# Patient Record
Sex: MALE | Race: ASIAN | NOT HISPANIC OR LATINO | Employment: FULL TIME | URBAN - METROPOLITAN AREA
[De-identification: names, ages, dates, MRNs, and addresses within clinical notes are randomized per-mention and may not be internally consistent; named-entity substitution may affect disease eponyms.]

---

## 2020-05-22 ENCOUNTER — VIRTUAL VISIT (OUTPATIENT)
Dept: URGENT CARE | Facility: CLINIC | Age: 20
End: 2020-05-22
Payer: COMMERCIAL

## 2020-05-22 DIAGNOSIS — U07.1 CLINICAL DIAGNOSIS OF COVID-19: ICD-10-CM

## 2020-05-22 DIAGNOSIS — G44.209 TENSION HEADACHE: Primary | ICD-10-CM

## 2020-05-22 PROCEDURE — 99203 OFFICE O/P NEW LOW 30 MIN: CPT | Mod: 95 | Performed by: FAMILY MEDICINE

## 2020-05-22 RX ORDER — IBUPROFEN 200 MG
600 TABLET ORAL EVERY 8 HOURS PRN
Qty: 90 TABLET | Refills: 1 | Status: SHIPPED | OUTPATIENT
Start: 2020-05-22

## 2020-05-22 ASSESSMENT — ENCOUNTER SYMPTOMS
DIARRHEA: 1
FATIGUE: 1
NAUSEA: 1
COUGH: 1
VOMITING: 0
HEADACHES: 1
ACTIVITY CHANGE: 1
APPETITE CHANGE: 1
CHILLS: 1
EYE PAIN: 1
EYE REDNESS: 0
SHORTNESS OF BREATH: 1
CHEST TIGHTNESS: 1

## 2020-05-22 NOTE — PROGRESS NOTES
"The patient has been notified of following:     \"This telephone visit will be conducted via a call between you and your physician/provider. We have found that certain health care needs can be provided without the need for a physical exam.  This service lets us provide the care you need with a short phone conversation.  If a prescription is necessary we can send it directly to your pharmacy.      Telephone visits are billed at different rates depending on your insurance coverage. During this emergency period, for some insurers they may be billed the same as an in-person visit.  Please reach out to your insurance provider with any questions.    If during the course of the call the physician/provider feels a telephone visit is not appropriate, you will not be charged for this service.\"    Patient has given verbal consent for Telephone visit?  Yes      Subjective     CC: Genaro Silverio  is a 20 year old male who presents to clinic today for the following health issues:   Chief Complaint   Patient presents with     Suspected Covid        History:  History was gathered from Genaro and a family member through a [employer] .    Genaro started to feel sick 2-3 days ago.  He reports his breathing is different, it is faster than normal.  He is able to walk across the room but walking up stairs would make him very tired.  He has a productive cough both during the day and waking him up at night.  He describes what is coming up just as phlem.   He also feels chills and a headache.  He hasn't been able to eat, he feels nauseated and wants to throw up but hasn't, and also has diarrhea.  He is able to drink and keep fluids down.  He endorses anosmia.    Genaro also reports that \"my eyes hurt.\"  It feels like his eyes, not behind his eyes.  It hurts to move his eyes.  His vision is not different.  This eye pain has been happening periodically since age 7, it always comes with a headache. Before Tylenol would help it go away, but now is not " "relieved with Tylenol.    Soc Hx:  Genaro lives at home with 10 people, including his mother, aunt, uncle, brother and sister.  They are all sick as well, his mother got a COVID test but does not have results yet.  He also knows sick people at work, he last worked on 5/14/20.  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 I am coughing up a lot of mucus.     What is the appearance of the mucus? Unable to clarify    If having shortness of breath, how severe?: Mild dyspnea (decreased exercise tolerance, able to do ADLs without difficulty)    If having fever, what is Tmax in the past 24 hours?: unable to measure    Denies: Conjunctivitis    When did the symptoms starts?: 5/19/20    Do you smoke? No     Have you ever smoked? I have never smoked       Are there people you know with similar symptoms? Yes     What was the patient(s) diagnosed with? Results pending    In the 14 days before your symptoms started, have you had close contact with a COVID-19 (Coronavirus) patient? Doesn't know    In the 14 days before your symptoms started, have you traveled internationally or to a state with high rates of COVID19? No    Have you recently been hospitalized? No    Are you pregnant or breastfeeding?: No    Are you or a household member a healthcare worker who provides direct patient care or a ? Unable to confirm    Do you live in a residential facility like a nursing home or group home? No    Do you have a way to get food/medications if quarantined? No    No past medical history on file.  Current Outpatient Medications   Medication     ibuprofen (ADVIL/MOTRIN) 200 MG tablet "     No current facility-administered medications for this visit.      No Known Allergies    Reviewed and updated as needed this visit by Provider    Review of Systems   Constitutional: Positive for activity change, appetite change, chills and fatigue.   Eyes: Positive for pain. Negative for redness.   Respiratory: Positive for cough, chest tightness and shortness of breath.    Gastrointestinal: Positive for diarrhea and nausea. Negative for vomiting.   Skin: Negative for rash.   Neurological: Positive for headaches.          Objective    Gen: Patient is alert, oriented  Resp: Speaking full sentence, no audible shortness of breath.  No cough of wheeze.  Psych: Unable to assess through        Assessment/Plan:  1. Tension headache  Headaches with eye pain and light sensitivity that have been periodic since a young age and are relieved with Tylenol could be tension headache, possibly migraine.  Plan: Advised patient to try ibuprofin, send Rx to pharmacy.  If that does not relieve eye pain, pt instructed to go to ER to have eye pain evaluated.    - ibuprofen (ADVIL/MOTRIN) 200 MG tablet; Take 3 tablets (600 mg) by mouth every 8 hours as needed for mild pain  Dispense: 90 tablet; Refill: 1    2. Clinical diagnosis of COVID-19  Pt has 3 days of cough, SOB, subjective fever, anosmia, nausea, and diarrhea.  Likely COVID-19 infection, although other viral URI and viral gastroenteritis cannot be ruled out.  Risk of COVID infection high with others in the house also ill with possible COVID and possible exposures at work (although no confirmed cases.)  Plan: encouraged drinking fluids.  Reinforced need for everyone in the home to stay home for 10 days after sx onset.    [facility] does not meet [facility] criteria for COVID-19 PCR testing.    Phone call duration:  45 minutes    Isabel Orr, MSKirstie  05/22/204:30 PM

## 2020-05-23 NOTE — PROGRESS NOTES
I was present during this telephone visit with the medical student who participated in the service and in the documentation of the note. I have verified the history and personally performed the physical exam and medical decision making. I agree with the assessment and plan of care as documented in the note with the following additions:  Eye pain is obviously concerning but patient reports it is the same pain he has had on and off for many years. Advised trial of NSAIDs and if not improving to seek in person evaluation.     I personally spent a total of 26 minutes speaking with Genaro Silverio via  during today s visit.     Mark Huang MD  10:17 AM, May 23, 2020

## 2020-05-29 ENCOUNTER — VIRTUAL VISIT (OUTPATIENT)
Dept: FAMILY MEDICINE | Facility: CLINIC | Age: 20
End: 2020-05-29
Payer: COMMERCIAL

## 2020-05-29 DIAGNOSIS — Z20.822 SUSPECTED COVID-19 VIRUS INFECTION: Primary | ICD-10-CM

## 2020-05-29 NOTE — PROGRESS NOTES
" Family Medicine Telephone Visit Note         Telephone Visit Consent   Patient was verbally read the following and verbal consent was obtained.    \"Telephone visits are billed at different rates depending on your insurance coverage. During this emergency period, for some insurers they may be billed the same as an in-person visit.  Please reach out to your insurance provider with any questions.  If during the course of the call the physician/provider feels a telephone visit is not appropriate, you will not be charged for this service.\"    Name person giving consent:  Patient   Date verbal consent given:  5/29/2020  Time verbal consent given:  9:42 AM    Chief Complaint   Patient presents with     Headache     Generalized Body Aches     Due to patient being non-English speaking/uses sign language, an  was used for this visit. Only for face-to-face interpretation by an external agency, date and length of interpretation can be found on the scanned worksheet.     name: Roseann boggs  Agency: [employer]  Language: Rayna  Telephone number: [phone]  Type of interpretation: Telephone, spoken         HPI   Patients name: [facility]  Appointment start time:  9:45 AM    Patient is calling about ongoing URI symptoms that have been ongoing for the past 2 weeks.  Reports that both his mom and his aunt had tested positive for COVID-19 and that he had exposure to them.  They all currently live in the same house.  Initially started as having shortness of breath and a dry cough but states that that this has been improving.  He has had fevers and chills but reports that his last fever was about 3 days ago.  Patient would like an work note as this is needed to go back to work.  He also needs a confirmation of negative testing before he goes back to work.  Patient currently works at [employer] and [employer] company and would like his results inability to work faxed to them.  He is unsure of the fax number and reports that he will call " "us back with this information.  Otherwise denies any abdominal pain, no breathing concerns at this time.  He has been trying to quarantine himself from outsiders.    Current Outpatient Medications   Medication Sig Dispense Refill     ibuprofen (ADVIL/MOTRIN) 200 MG tablet Take 3 tablets (600 mg) by mouth every 8 hours as needed for mild pain 90 tablet 1     No Known Allergies         Review of Systems:     Constitutional, HEENT, cardiovascular, pulmonary, gi and gu systems are negative, except as otherwise noted.         Physical Exam:     There were no vitals taken for this visit.  Estimated body mass index is 22.27 kg/m  as calculated from the following:    Height as of 10/22/15: 1.641 m (5' 4.6\").    Weight as of 10/22/15: 60 kg (132 lb 3.2 oz).    Exam:  Constitutional: healthy, alert and no distress  Psychiatric: mentation appears normal and affect normal/bright     Results from the last 24 hoursNo results found for this or any previous visit (from the past 24 hour(s)).        Assessment and Plan   Genaro was seen today for headache and generalized body aches.    Diagnoses and all orders for this visit:    Suspected Covid-19 Virus Infection  Patient is a 20-year-old previously healthy male who is calling in today about a 2-week history of headache, generalized body aches, fever, shortness of breath as well as dry cough.  Reports that his symptoms have gotten much better over the past 3 days.  He states that it has been 14 days since symptom onset, has been fever free during the 48 hours and his cough is not completely resolved.  His work does not want him to have cleared testing before he returns back to work.  Patient does drive and so I am okay with him getting tested for COVID-19.  Future testing is placed for this.  -     Symptomatic COVID-19 Virus (Coronavirus) by PCR    Refilled medications that would be required in the next 3 months.     After Visit Information:  Patient declined AVS     No follow-ups on " file.    Appointment end time: 10:01 AM  This is a telephone visit that took 16 minutes.    Clinician location:  [facility]    Justus Ritchie MD  I precepted today with Dr. Forest Moon.

## 2020-06-04 NOTE — PROGRESS NOTES
Preceptor Attestation:   I talked to the patient on the phone. I discussed the patient with the resident. I have verified the content of the note, which accurately reflects my assessment of the patient and the plan of care.   Supervising Physician:  Forest Moon MD.

## 2020-06-06 ENCOUNTER — TELEPHONE (OUTPATIENT)
Dept: FAMILY MEDICINE | Facility: CLINIC | Age: 20
End: 2020-06-06

## 2020-06-06 NOTE — TELEPHONE ENCOUNTER
Patient called as part of COVID outreach for the [facility]:      Patient was unable to be reached      Nathaniel Costello, MS4    Dr. Pau Mendoza is the supervising attending physician.

## 2020-06-08 NOTE — TELEPHONE ENCOUNTER
Patient called as part of COVID outreach for the [facility].    Unable to reach patient, could not leave message.     Dinora Jennings, MS4  [employer]

## 2020-06-09 NOTE — TELEPHONE ENCOUNTER
Follow-up call made to [facility]/Rayna/[facility] patients with respiratory symptoms or COVID-19 PUI    Unable to reach patient. Left message after 2 consecutive phone calls. Called a third third time 15 minutes later and was still unable to reach patient.    Nathaniel Costello, MS4    Dr. Pau Mendoza is the supervising attending physician

## 2020-06-10 NOTE — TELEPHONE ENCOUNTER
Follow-up call made to [facility]/Rayna/[facility] patients with respiratory symptoms or COVID-19 PUI    Patient name: Genaro Silverio  Date of follow-up: 06/10/20   Time of follow-up:  3:15 PM; 3:28PM   Language:  Rayna  PCP:  Rahul Fischer  Outreach Caller Name: Barbara Cota  Day of Symptoms #20 (sx onset 5/20/20)    No answer left message that we will follow-up tomorrow.   Barbara Cota, MS4

## 2020-06-11 ENCOUNTER — TELEPHONE (OUTPATIENT)
Dept: FAMILY MEDICINE | Facility: CLINIC | Age: 20
End: 2020-06-11

## 2020-06-11 NOTE — TELEPHONE ENCOUNTER
Follow-up call made to [facility]/Rayna/[facility] patients with respiratory symptoms or COVID-19 PUI    UNABLE TO CONTACT    Patient name: Genaro Silverio  Date of follow-up: 06/11/20   Time of follow-up:  12:05 PM  Language:  Rayna  PCP:  Rahul Fischer  Outreach Caller Name: Brenda De Santiago, MS4  Day of Symptoms #5    Symptoms Question to patients Patient response   Breathing Over the last 24 hours, have your breathing issues improved, stayed the same, or worsened?    Fever Over the last 24 hours, has your fever improved, stayed the same, or worsened?    Intake Over the last 24 hours, have you been able to eat and drink?      Cognition Over the last 24 hours, have you or your family noticed any confusion or changes in your function?      Other notes:       Would you like to continue to receive check in calls from us?      Is anyone else in your family showing symptoms of COVID?   Would it be good for us to check in with them as well?     Plan:     Brenda De Santiago    I discussed this patient with faculty physician:

## 2020-06-12 NOTE — TELEPHONE ENCOUNTER
Follow-up call made to [facility]/Rayna/[facility] patients with respiratory symptoms or COVID-19 PUI    Patient name: Genaro Silverio  Date of follow-up: 06/12/20   Time of follow-up:  9:43 AM  Language:  Rayna  PCP:  Rahul Fischer  Outreach Caller Name: Priscilla Jennings, MS4  Day of Symptoms #6    Unable to reach patient, left message. Because it has been multiple days and we have not been able to reach the patient, checked phone numbers, only has one. In message explained that we will stop calling and he can call the clinic if he is not feeling well or has questions.    Dinora Jennings, MS4  [employer]

## 2021-12-31 ENCOUNTER — OFFICE VISIT (OUTPATIENT)
Dept: FAMILY MEDICINE | Facility: CLINIC | Age: 21
End: 2021-12-31
Payer: COMMERCIAL

## 2021-12-31 VITALS
HEART RATE: 93 BPM | OXYGEN SATURATION: 96 % | DIASTOLIC BLOOD PRESSURE: 87 MMHG | SYSTOLIC BLOOD PRESSURE: 133 MMHG | TEMPERATURE: 98.2 F | RESPIRATION RATE: 20 BRPM | WEIGHT: 150 LBS

## 2021-12-31 DIAGNOSIS — U07.1 COVID-19 VIRUS INFECTION: Primary | ICD-10-CM

## 2021-12-31 LAB
FLUAV AG SPEC QL IA: NEGATIVE
FLUBV AG SPEC QL IA: NEGATIVE

## 2021-12-31 PROCEDURE — U0003 INFECTIOUS AGENT DETECTION BY NUCLEIC ACID (DNA OR RNA); SEVERE ACUTE RESPIRATORY SYNDROME CORONAVIRUS 2 (SARS-COV-2) (CORONAVIRUS DISEASE [COVID-19]), AMPLIFIED PROBE TECHNIQUE, MAKING USE OF HIGH THROUGHPUT TECHNOLOGIES AS DESCRIBED BY CMS-2020-01-R: HCPCS | Performed by: STUDENT IN AN ORGANIZED HEALTH CARE EDUCATION/TRAINING PROGRAM

## 2021-12-31 PROCEDURE — 99213 OFFICE O/P EST LOW 20 MIN: CPT | Mod: CS | Performed by: STUDENT IN AN ORGANIZED HEALTH CARE EDUCATION/TRAINING PROGRAM

## 2021-12-31 PROCEDURE — 87804 INFLUENZA ASSAY W/OPTIC: CPT | Performed by: STUDENT IN AN ORGANIZED HEALTH CARE EDUCATION/TRAINING PROGRAM

## 2021-12-31 PROCEDURE — U0005 INFEC AGEN DETEC AMPLI PROBE: HCPCS | Performed by: STUDENT IN AN ORGANIZED HEALTH CARE EDUCATION/TRAINING PROGRAM

## 2021-12-31 NOTE — PROGRESS NOTES
Preceptor Attestation:  I discussed the patient with the resident and evaluated the patient in person. I have verified the content of the note, which accurately reflects my assessment of the patient and the plan of care.  Supervising Physician:  Tianna Mendoza MD.

## 2021-12-31 NOTE — PATIENT INSTRUCTIONS
1. Today we discussed cold symptoms.  2. Please take acetaminophen and ibuprofen for symptomatic relief as needed.  3. I will call you with your lab results once completed, which may take 1-2 days.  4. Please seek medical assistance if you develop notably worsening symptoms, fever, unintended weight loss, dehydration, shortness of breath, bloody cough, difficulty eating/drinking, drooling, or nausea with vomiting.    Please call [facility] with any questions or concerns.    Best regard,    Elton Juárez MD

## 2021-12-31 NOTE — LETTER
December 31, 2021      Genaro Silverio  [address] [place] [address]        To whom it may concern:    Genaro Silverio was seen at [facility] on 12/31/2021. He is not feeling well. Please excuse his absence while awaiting testing results.    Sincerely,    Elton Juárez MD

## 2021-12-31 NOTE — PROGRESS NOTES
Assessment and Plan        Genaro was seen today for fever.    Diagnoses and all orders for this visit:    COVID-19 virus infection  Patient presented with acute URI symptoms as described below. Has received COVID-19 vaccines, but due for booster. Patient was afebrile, oxygen level normal, and exam notable for no respiratory distress, lungs clear. COVID-19 test positive. Not at increased risk for severe disease. Discussed symptomatic relief options, indications for urgent re-evaluation, and indications for ending isolation.  -     Symptomatic; Yes; 12/29/2021 COVID-19 Virus (Coronavirus) by PCR Nose  -     Influenza A & B Antigen - Clinic Collect    Options for treatment and follow-up care were reviewed with the patient. Patient engaged in the decision making process and verbalized understanding of the options discussed and agreed with the final plan.    Patient was staffed with attending physician Dr. Mendoza.    Elton Juárez MD PGY2           HPI       Genaroalvaro Silverio is a 21 year old year old male w/ PMH of There is no problem list on file for this patient.   who presents for   Chief Complaint   Patient presents with     Fever     coughing, sore throat sob bodyaches 2 days     Acute Illness   Concerns: Feeling ill  When did it start? 2 days ago  Is it getting better, worse or staying the same? unchanged    Fatigue/Achiness?: YES    Fever?:  YES tactile    Headache (location?) YES     Sinus Pressure?:No     Eye redness/Discharge?: No     Ear Pain?: No     Runny nose?:  YES     Sore Throat?:  YES  Respiratory    Cough?:  YES-productive of clear sputum    Wheeze?: No   GI/    Decreased Appetite?:  No     Nausea?: No     Vomiting?:  No     Diarrhea?:  No       Any Illness Exposure?:  YES sister    Any foreign travel or contact with anyone ill who travelled abroad? No          Review of Systems:   7 point ROS negative other than as specified above.         Physical Exam:   /87 (BP Location: Left arm, Patient  Position: Sitting, Cuff Size: Adult Regular)   Pulse 93   Temp 98.2  F (36.8  C) (Oral)   Resp 20   Wt 68 kg (150 lb)   SpO2 96%     Vitals were reviewed and were normal     Exam:  Constitutional: healthy, alert, no distress, and cooperative  Head: Normocephalic  Neck: Neck supple. No adenopathy.  ENT: oral mucosa moist; ear canal & TM normal; throat normal without erythema or exudate  Cardiovascular: RRR w/o audible murmur  Respiratory: bilateral clear lungs w/o wheezing, crackles or rhonchi; breathing comfortably on RA  Gastrointestinal: Abdomen soft, non-tender  Musculoskeletal: extremities normal- no gross deformities noted, gait normal, and normal muscle tone  Neurologic: grossly normal CN  Psychiatric: mentation appears normal and affect normal      Results:      Results from this visit  Results for orders placed or performed in visit on 12/31/21   Symptomatic; Yes; 12/29/2021 COVID-19 Virus (Coronavirus) by PCR Nose     Status: Abnormal    Specimen: Nose; Swab   Result Value Ref Range    SARS CoV2 PCR Positive (A) Negative, Testing sent to reference lab. Results will be returned via unsolicited result    Narrative    Testing was performed using the Aptima SARS-CoV-2 Assay on the  [employer] Instrument System. Additional information about this  Emergency Use Authorization (EUA) assay can be found via the Lab  Guide. This test should be ordered for the detection of SARS-CoV-2 in  individuals who meet SARS-CoV-2 clinical and/or epidemiological  criteria. Test performance is unknown in asymptomatic patients. This  test is for in vitro diagnostic use under the FDA EUA for  laboratories certified under CLIA to perform high complexity testing.  This test has not been FDA cleared or approved. A negative result  does not rule out the presence of PCR inhibitors in the specimen or  target RNA in concentration below the limit of detection for the  assay. The possibility of a false negative should be considered if  the  patient's recent exposure or clinical presentation suggests  COVID-19. This test was validated by the [facility]. This laboratory is certified under  the Clinical Laboratory Improvement Amendments of 1988 (CLIA-88) as  qualified to perform high complexity laboratory testing.   Influenza A & B Antigen - Clinic Collect     Status: Normal    Specimen: Nose; Swab   Result Value Ref Range    Influenza A antigen Negative Negative    Influenza B antigen Negative Negative    Narrative    Test results must be correlated with clinical data. If necessary, results should be confirmed by a molecular assay or viral culture.

## 2022-01-01 LAB — SARS-COV-2 RNA RESP QL NAA+PROBE: POSITIVE

## 2022-01-02 ENCOUNTER — TELEPHONE (OUTPATIENT)
Dept: FAMILY MEDICINE | Facility: CLINIC | Age: 22
End: 2022-01-02
Payer: COMMERCIAL

## 2022-01-02 NOTE — TELEPHONE ENCOUNTER
Coronavirus (COVID-19) Notification    Reason for call  Notify of POSITIVE  COVID-19 lab result, assess symptoms,  review [facility] recommendations    Lab Result   Lab test for 2019-nCoV rRt-PCR or SARS-COV-2 PCR  Oropharyngeal AND/OR nasopharyngeal swabs were POSITIVE for 2019-nCoV RNA [OR] SARS-COV-2 RNA (COVID-19) RNA     We have been unable to reach Patient by phone at this time to notify of their Positive COVID-19 result.  Left voicemail message requesting a call back to [phone] [facility] for results.        POSITIVE COVID-19 Letter sent.    Brianna Reynoso LPN

## 2022-01-06 PROBLEM — U07.1 COVID-19 VIRUS INFECTION: Status: ACTIVE | Noted: 2022-01-06

## 2023-02-22 ENCOUNTER — APPOINTMENT (OUTPATIENT)
Dept: RADIOLOGY | Facility: HOSPITAL | Age: 23
End: 2023-02-22
Attending: EMERGENCY MEDICINE
Payer: COMMERCIAL

## 2023-02-22 ENCOUNTER — HOSPITAL ENCOUNTER (EMERGENCY)
Facility: HOSPITAL | Age: 23
Discharge: HOME | End: 2023-02-22
Attending: EMERGENCY MEDICINE | Admitting: EMERGENCY MEDICINE
Payer: COMMERCIAL

## 2023-02-22 VITALS
OXYGEN SATURATION: 98 % | TEMPERATURE: 98.6 F | RESPIRATION RATE: 18 BRPM | DIASTOLIC BLOOD PRESSURE: 80 MMHG | SYSTOLIC BLOOD PRESSURE: 138 MMHG | HEART RATE: 70 BPM | WEIGHT: 160 LBS

## 2023-02-22 DIAGNOSIS — R10.84 ABDOMINAL PAIN, GENERALIZED: ICD-10-CM

## 2023-02-22 DIAGNOSIS — K59.00 CONSTIPATION, UNSPECIFIED CONSTIPATION TYPE: ICD-10-CM

## 2023-02-22 LAB
ALBUMIN SERPL BCG-MCNC: 4.4 G/DL (ref 3.5–5.2)
ALP SERPL-CCNC: 89 U/L (ref 40–129)
ALT SERPL W P-5'-P-CCNC: 19 U/L (ref 10–50)
ANION GAP SERPL CALCULATED.3IONS-SCNC: 7 MMOL/L (ref 7–15)
AST SERPL W P-5'-P-CCNC: 18 U/L (ref 10–50)
BASOPHILS # BLD AUTO: 0 10E3/UL (ref 0–0.2)
BASOPHILS NFR BLD AUTO: 0 %
BILIRUB SERPL-MCNC: 0.4 MG/DL
BUN SERPL-MCNC: 14.2 MG/DL (ref 6–20)
CALCIUM SERPL-MCNC: 9.4 MG/DL (ref 8.6–10)
CHLORIDE SERPL-SCNC: 103 MMOL/L (ref 98–107)
CREAT SERPL-MCNC: 0.89 MG/DL (ref 0.67–1.17)
DEPRECATED HCO3 PLAS-SCNC: 25 MMOL/L (ref 22–29)
EOSINOPHIL # BLD AUTO: 0 10E3/UL (ref 0–0.7)
EOSINOPHIL NFR BLD AUTO: 0 %
ERYTHROCYTE [DISTWIDTH] IN BLOOD BY AUTOMATED COUNT: 12.8 % (ref 10–15)
GFR SERPL CREATININE-BSD FRML MDRD: >90 ML/MIN/1.73M2
GLUCOSE SERPL-MCNC: 111 MG/DL (ref 70–99)
HCT VFR BLD AUTO: 48 % (ref 40–53)
HGB BLD-MCNC: 15.4 G/DL (ref 13.3–17.7)
IMM GRANULOCYTES # BLD: 0 10E3/UL
IMM GRANULOCYTES NFR BLD: 0 %
LIPASE SERPL-CCNC: 17 U/L (ref 13–60)
LYMPHOCYTES # BLD AUTO: 1.3 10E3/UL (ref 0.8–5.3)
LYMPHOCYTES NFR BLD AUTO: 13 %
MCH RBC QN AUTO: 28.1 PG (ref 26.5–33)
MCHC RBC AUTO-ENTMCNC: 32.1 G/DL (ref 31.5–36.5)
MCV RBC AUTO: 88 FL (ref 78–100)
MONOCYTES # BLD AUTO: 0.4 10E3/UL (ref 0–1.3)
MONOCYTES NFR BLD AUTO: 4 %
NEUTROPHILS # BLD AUTO: 8.3 10E3/UL (ref 1.6–8.3)
NEUTROPHILS NFR BLD AUTO: 83 %
NRBC # BLD AUTO: 0 10E3/UL
NRBC BLD AUTO-RTO: 0 /100
PLATELET # BLD AUTO: 285 10E3/UL (ref 150–450)
POTASSIUM SERPL-SCNC: 4.2 MMOL/L (ref 3.4–5.3)
PROT SERPL-MCNC: 7.4 G/DL (ref 6.4–8.3)
RBC # BLD AUTO: 5.48 10E6/UL (ref 4.4–5.9)
SODIUM SERPL-SCNC: 135 MMOL/L (ref 136–145)
WBC # BLD AUTO: 10.1 10E3/UL (ref 4–11)

## 2023-02-22 PROCEDURE — 83690 ASSAY OF LIPASE: CPT | Performed by: EMERGENCY MEDICINE

## 2023-02-22 PROCEDURE — 85004 AUTOMATED DIFF WBC COUNT: CPT | Performed by: EMERGENCY MEDICINE

## 2023-02-22 PROCEDURE — 74019 RADEX ABDOMEN 2 VIEWS: CPT

## 2023-02-22 PROCEDURE — 36415 COLL VENOUS BLD VENIPUNCTURE: CPT | Performed by: EMERGENCY MEDICINE

## 2023-02-22 PROCEDURE — 99284 EMERGENCY DEPT VISIT MOD MDM: CPT

## 2023-02-22 PROCEDURE — 80053 COMPREHEN METABOLIC PANEL: CPT | Performed by: EMERGENCY MEDICINE

## 2023-02-22 RX ORDER — POLYETHYLENE GLYCOL 3350 17 G/17G
POWDER, FOR SOLUTION ORAL
Qty: 816 G | Refills: 0 | Status: SHIPPED | OUTPATIENT
Start: 2023-02-22 | End: 2023-03-27

## 2023-02-22 ASSESSMENT — ENCOUNTER SYMPTOMS
VOMITING: 0
ABDOMINAL PAIN: 1
DIARRHEA: 0

## 2023-02-22 ASSESSMENT — ACTIVITIES OF DAILY LIVING (ADL): ADLS_ACUITY_SCORE: 35

## 2023-02-22 NOTE — ED PROVIDER NOTES
EMERGENCY DEPARTMENT ENCOUNTER     NAME: Genaro Silverio   AGE: 22 year old male   YOB: 2000   MRN: [id number]   EVALUATION DATE & TIME: No admission date for patient encounter.   PCP: Rahul Fischer     Chief Complaint   Patient presents with     Abdominal Pain   :    FINAL IMPRESSION       1. Abdominal pain, generalized    2. Constipation, unspecified constipation type           ED COURSE & MEDICAL DECISION MAKING    11:36 AM I introduced myself to the patient, obtained patient history, performed a physical exam, and discussed plan for ED workup including potential diagnostic laboratory/imaging studies and interventions.  1:10 PM Rechecked and updated the patient. We discussed the plan for discharge and the patient is agreeable. Reviewed supportive cares, symptomatic treatment, outpatient follow up, and reasons to return to the Emergency Department. Patient to be discharged by ED RN.     Pertinent Labs & Imaging studies reviewed. (See chart for details)   22 year old male  presents to the Emergency Department for evaluation of what he is calling a generalized stomachache and constipation.  He had 1 small hard stool this morning. Initial Vitals Reviewed. Initial exam notable for well-appearing male with a benign abdomen, intact bowel sounds without any tenderness.  I have a low suspicion for acute surgical pathology as his abdomen is nontender.  He has no other GI symptoms.  I did do labs and he has no leukocytosis, no signs of pancreatitis or biliary pathology.  X-ray appears to show a moderate stool burden throughout.  I will start him on high-dose MiraLAX therapy and have him discharged with outpatient follow-up.           At the conclusion of the encounter I discussed the results of all of the tests and the disposition. The questions were answered. The patient or family acknowledged understanding and was agreeable with the care plan.         Medical Decision Making    History:    Supplemental history  from: Family Member/Significant Other    External Record(s) reviewed: Documented in chart, if applicable.    Work Up:    Chart documentation includes differential considered and any EKGs or imaging independently interpreted by provider, where specified.    In aCT, but low suspicion for surgical process    External consultation:    Discussion of management with another provider: Documented in chart, if applicable    Complicating factors:    Care impacted by chronic illness: N/A    Care affected by social determinants of health: access to care    Disposition considerations: Discharge. I prescribed additional prescription strength medication(s) as charted. I considered admission, but ultimately discharged patient reassuring exam and workup.      MEDICATIONS GIVEN IN THE EMERGENCY:   Medications - No data to display   NEW PRESCRIPTIONS STARTED AT TODAY'S ER VISIT   New Prescriptions    POLYETHYLENE GLYCOL (MIRALAX) 17 GM/DOSE POWDER    Take 51 g (3 capfuls) by mouth 2 times daily for 3 days, THEN 17 g (1 capful.) daily for 30 days.     ================================================================   HISTORY OF PRESENT ILLNESS       Patient information was obtained from: Patient and sister    Use of Intrepreter: N/A  Genaro Silverio is a 22 year old male with a history of constipation who presents to the ED via walk in for the evaluation of abdominal pain.      The patient presents to the ED after having persistent generalized abdominal pain that began yesterday. He has a history of constipation and notes that the pain feels similar to it. He also took stool softeners with no relief. He denies any vomiting or diarrhea.      ================================================================    REVIEW OF SYSTEMS       Review of Systems   Gastrointestinal: Positive for abdominal pain. Negative for diarrhea and vomiting.         PAST HISTORY     PAST MEDICAL HISTORY:   No past medical history on file.   PAST SURGICAL HISTORY:   No  past surgical history on file.   CURRENT MEDICATIONS:   polyethylene glycol (MIRALAX) 17 GM/Dose powder  ibuprofen (ADVIL/MOTRIN) 200 MG tablet      ALLERGIES:   No Known Allergies   FAMILY HISTORY:   Family History   Problem Relation Age of Onset     Diabetes No family hx of      Coronary Artery Disease No family hx of      Hypertension No family hx of      Hyperlipidemia No family hx of      Cerebrovascular Disease No family hx of      Breast Cancer No family hx of      Colon Cancer No family hx of      Prostate Cancer No family hx of      Other Cancer No family hx of      Depression No family hx of      Anxiety Disorder No family hx of      Mental Illness No family hx of      Substance Abuse No family hx of      Anesthesia Reaction No family hx of      Asthma No family hx of      Osteoporosis No family hx of      Genetic Disorder No family hx of      Thyroid Disease No family hx of      Obesity No family hx of      Unknown/Adopted No family hx of       SOCIAL HISTORY:   Social History     Socioeconomic History     Marital status: Single   Tobacco Use     Smoking status: Never     Smokeless tobacco: Never   Substance and Sexual Activity     Alcohol use: No     Drug use: No        VITALS  Patient Vitals for the past 24 hrs:   BP Temp Pulse Resp SpO2 Weight   02/22/23 1123 138/82 98.6  F (37  C) 79 16 97 % 72.6 kg (160 lb)        ================================================================    PHYSICAL EXAM     VITAL SIGNS: /82   Pulse 79   Temp 98.6  F (37  C)   Resp 16   Wt 72.6 kg (160 lb)   SpO2 97%    Constitutional:  Awake, no acute distress   HENT:  Atraumatic, oropharynx without exudate or erythema, membranes moist  Lymph:  No adenopathy  Eyes: EOM intact, PERRL, no injection  Neck: Supple  Respiratory:  Clear to auscultation bilaterally, no wheezes or crackles   Cardiovascular:  Regular rate and rhythm, single S1 and S2   GI:  Soft, nontender, nondistended, no rebound or guarding. Bowel sounds  normal.    Musculoskeletal:  Moves all extremities, no lower extremity edema, no deformities    Skin:  Warm, dry  Neurologic:  Alert and oriented x3, no focal deficits noted       ================================================================  LAB       All pertinent labs reviewed and interpreted.   Labs Ordered and Resulted from Time of ED Arrival to Time of ED Departure   COMPREHENSIVE METABOLIC PANEL - Abnormal       Result Value    Sodium 135 (*)     Potassium 4.2      Chloride 103      Carbon Dioxide (CO2) 25      Anion Gap 7      Urea Nitrogen 14.2      Creatinine 0.89      Calcium 9.4      Glucose 111 (*)     Alkaline Phosphatase 89      AST 18      ALT 19      Protein Total 7.4      Albumin 4.4      Bilirubin Total 0.4      GFR Estimate >90     LIPASE - Normal    Lipase 17     CBC WITH PLATELETS AND DIFFERENTIAL    WBC Count 10.1      RBC Count 5.48      Hemoglobin 15.4      Hematocrit 48.0      MCV 88      MCH 28.1      MCHC 32.1      RDW 12.8      Platelet Count 285      % Neutrophils 83      % Lymphocytes 13      % Monocytes 4      % Eosinophils 0      % Basophils 0      % Immature Granulocytes 0      NRBCs per 100 WBC 0      Absolute Neutrophils 8.3      Absolute Lymphocytes 1.3      Absolute Monocytes 0.4      Absolute Eosinophils 0.0      Absolute Basophils 0.0      Absolute Immature Granulocytes 0.0      Absolute NRBCs 0.0          ===============================================================  RADIOLOGY       Reviewed all pertinent imaging. Please see official radiology report.   Abdomen XR, 2 vw, flat and upright   Final Result   IMPRESSION: Negative abdomen. Mild colonic stool burden. Bowel gas pattern is normal. Nothing for obstruction or free air. No evidence for renal stones.            ================================================================  EKG         I have independently reviewed and interpreted the EKG(s) documented above.      ================================================================  PROCEDURES         I, Catalinofrancisco Sai, am serving as a scribe to document services personally performed by Dr. Puckett based on my observation and the provider's statements to me. I, Mayra Puckett MD attest that Dread Gibbs is acting in a scribe capacity, has observed my performance of the services and has documented them in accordance with my direction.     Mayra Puckett M.D.   Emergency Medicine   [facility] EMERGENCY DEPARTMENT  [address] [place] [id number]  [phone]  Dept: [phone]      Mayra Puckett MD  02/22/23 1325

## 2023-02-22 NOTE — ED TRIAGE NOTES
Patient presents here for evaluation of upper abdominal pain that has persisted over the the past 24 hours. He reports that the pain is generalized over his abdomen. No vomiting, diarrhea, fevers or chills. He does have a history of constipation. He did evacuate a small, hard stool this morning.